# Patient Record
Sex: FEMALE | Race: WHITE | NOT HISPANIC OR LATINO | URBAN - NONMETROPOLITAN AREA
[De-identification: names, ages, dates, MRNs, and addresses within clinical notes are randomized per-mention and may not be internally consistent; named-entity substitution may affect disease eponyms.]

---

## 2020-06-09 ENCOUNTER — OFFICE VISIT CONVERTED (OUTPATIENT)
Dept: FAMILY MEDICINE CLINIC | Age: 20
End: 2020-06-09
Attending: NURSE PRACTITIONER

## 2020-06-09 ENCOUNTER — HOSPITAL ENCOUNTER (OUTPATIENT)
Dept: OTHER | Facility: HOSPITAL | Age: 20
Discharge: HOME | End: 2020-06-09
Attending: NURSE PRACTITIONER

## 2020-06-09 LAB
25(OH)D3 SERPL-MCNC: 12.3 NG/ML (ref 30–100)
ALBUMIN SERPL-MCNC: 4.1 G/DL (ref 3.5–5)
ALBUMIN/GLOB SERPL: 1.4 {RATIO} (ref 1.4–2.6)
ALP SERPL-CCNC: 34 U/L (ref 50–130)
ALT SERPL-CCNC: 13 U/L (ref 10–40)
ANION GAP SERPL CALC-SCNC: 19 MMOL/L (ref 8–19)
ASO AB SERPL-ACNC: 88 [IU]/ML (ref 0–200)
AST SERPL-CCNC: 17 U/L (ref 15–50)
BILIRUB SERPL-MCNC: 0.19 MG/DL (ref 0.2–1.3)
BUN SERPL-MCNC: 9 MG/DL (ref 5–25)
BUN/CREAT SERPL: 12 {RATIO} (ref 6–20)
CALCIUM SERPL-MCNC: 9.3 MG/DL (ref 8.7–10.4)
CHLORIDE SERPL-SCNC: 101 MMOL/L (ref 99–111)
CONV CO2: 20 MMOL/L (ref 22–32)
CONV RHEUMATOID FACTOR IGM: <10 [IU]/ML (ref 0–14)
CONV TOTAL PROTEIN: 7.1 G/DL (ref 6.3–8.2)
CREAT UR-MCNC: 0.75 MG/DL (ref 0.5–0.9)
ERYTHROCYTE [SEDIMENTATION RATE] IN BLOOD: 7 MM/H (ref 0–20)
FOLATE SERPL-MCNC: 6.5 NG/ML (ref 4.8–20)
GFR SERPLBLD BASED ON 1.73 SQ M-ARVRAT: >60 ML/MIN/{1.73_M2}
GLOBULIN UR ELPH-MCNC: 3 G/DL (ref 2–3.5)
GLUCOSE SERPL-MCNC: 84 MG/DL (ref 65–99)
MAGNESIUM SERPL-MCNC: 1.98 MG/DL (ref 1.6–2.3)
OSMOLALITY SERPL CALC.SUM OF ELEC: 280 MOSM/KG (ref 273–304)
POTASSIUM SERPL-SCNC: 3.9 MMOL/L (ref 3.5–5.3)
SODIUM SERPL-SCNC: 136 MMOL/L (ref 135–147)
URATE SERPL-MCNC: 3.9 MG/DL (ref 2.5–6.2)
VIT B12 SERPL-MCNC: 473 PG/ML (ref 211–911)

## 2020-06-10 LAB
B BURGDOR IGG+IGM SER-ACNC: <0.91 ISR (ref 0–0.9)
DSDNA AB SER-ACNC: NEGATIVE [IU]/ML
ENA AB SER IA-ACNC: NEGATIVE {RATIO}

## 2021-05-18 NOTE — PROGRESS NOTES
"Roxane Villegas  2000     Office/Outpatient Visit    Visit Date: Tue, Jun 9, 2020 08:33 am    Provider: Mona Canales N.P. (Assistant: Zuleyka Robbins MA)    Location: [facility]        Electronically signed by Mona Canales N.P. on  06/09/2020 09:45:34 AM                             Subjective:        CC: Ms. Villegas is a 19 year old White female.  This is her first visit to the clinic.  left knee pain;         HPI: 19 year old female presenting to clinic to establish care and c/o left knee pain x 2-3 weeks. She states it began to hurt 4 years ago and had went to Dr. Reardon. He informed her that she had \"loose joints\" and recommended physical therapy. She has had physical therapy on her left shoulder, right hip and left knee. She also states that her ankles both \"buckle\" at times. She has full range of motion. No specific area of pain. She went for second opinion 1-2 weeks ago as her knee began to hurt again and he said the same. She has P.T. scheduled for tomorrow. He recommended she find a pcp for work up regarding her joint pain.         Pt also c/o both anxiety and depression. States she has had this on and off for years. No suicidal thoughts. She tried counseling earlier this year and was not able to afford. She states that she is still on her parent's insurance and they are very much against medicinal intervention for anxiety/depression. This is why she was trying to pay out of pocket for counseling sessions. She is open to trying medication, and will just pay cash, so that her parents are not aware.         Medical, surgical, family and social history reviewed.    ROS:     CONSTITUTIONAL:  Negative for chills, fatigue, fever and night sweats.      CARDIOVASCULAR:  Negative for chest pain, palpitations and pedal edema.      RESPIRATORY:  Negative for recent cough and dyspnea.      GASTROINTESTINAL:  Negative for abdominal pain, constipation, diarrhea, nausea and vomiting.      " GENITOURINARY:  Negative for dysuria and urinary incontinence.      MUSCULOSKELETAL:  Positive for arthralgias and joint stiffness.   Negative for myalgias.      INTEGUMENTARY/BREAST:  Negative for rash.      NEUROLOGICAL:  Negative for dizziness, paresthesias and weakness.      PSYCHIATRIC:  Positive for feelings of stress, anhedonia and sadness.   Negative for sleep disturbance or suicidal thoughts.          Past Medical History / Family History / Social History:         Last Reviewed on 6/09/2020 08:56 AM by Mona Canales    Past Medical History:                 PAST MEDICAL HISTORY         Chicken pox: no;         GYNECOLOGICAL HISTORY:    G0    Problems with menstrual cycles include irregular frequency/duration.      Menarche occurred at age 11 yr.  Hospitalizations:    Asthma admitted multiple times         CURRENT MEDICAL PROVIDERS:    Allergist: family allergy and asthma         Surgical History:         Positive for    adnoids; and    wisdom teeth;;         Family History:     Unknown Father: Type 2 Diabetes     Maternal Grandfather: Skin Cancer     Maternal Grandmother: Cervical Cancer         Social History:         Household:  Lives with her parents and sibling(s) ( 1 brother ).  [employer] Occupation: [employer]     Marital Status: Single     Children: None         Tobacco/Alcohol/Supplements:     Last Reviewed on 6/09/2020 08:56 AM by Mona Canales    Tobacco: She has never smoked.  Non-drinker         Substance Abuse History:     Last Reviewed on 6/09/2020 08:56 AM by Mona Canales    None         Mental Health History:     Last Reviewed on 6/09/2020 08:56 AM by Mona Canales    NEGATIVE         Immunizations:     DTaP 2000    DTaP 2000    DTaP 1/3/2001    DTaP 10/18/2001    DTaP 7/19/2004    TriHIBit (DTaP/Hib) 10/18/2001    Hib PRP-OMP (3-dose) 2000    Hib PRP-OMP (3-dose) 2000    ActHIB (Hib PRP-T) 1/3/2001    Hep B (pedi/adol, 3-dose schedule) 2000    Hep B  (pedi/adol, 3-dose schedule) 1/3/2001    Hep B (pedi/adol, 3-dose schedule) 10/18/2001    IPV  Poliovirus, inactivated 2000    IPV  Poliovirus, inactivated 2000    IPV  Poliovirus, inactivated 10/18/2001    IPV  Poliovirus, inactivated 7/19/2004    MMR  (Measles-Mumps-Rubella), live 7/9/2001    MMR  (Measles-Mumps-Rubella), live 7/19/2004    Varicella, live 7/9/2001    Varicella, live 8/8/2007    Prevnar (Pneumococcal PCV 7) 2000    Prevnar (Pneumococcal PCV 7) 2000    Prevnar (Pneumococcal PCV 7) 1/3/2001    Prevnar (Pneumococcal PCV 7) 7/9/2001    Influenza, split virus (6-35 months dose) 11/20/2003    Influenza, split virus (6-35 months dose) 12/18/2003    Influenza, split virus (3+ years dose) 10/20/2004    Influenza, split virus (3+ years dose) 10/4/2005    Influenza, split virus (3+ years dose) 11/20/2006    Influenza, split virus (3+ years dose) 11/5/2007    Influenza, split virus (3+ years dose) 10/20/2008    Influenza, split virus (3+ years dose) 12/9/2009    FluMist 11/15/2011    PNEUMOVAX 23 (Pneumococcal PPV23) 11/20/2006    Menactra (Meningococcal MCV4P) 6/20/2011    Adacel (Tdap) 6/20/2011        Allergies:     Last Reviewed on 6/09/2020 08:57 AM by Mona Canales    Peanuts:          Current Medications:     Last Reviewed on 6/09/2020 08:56 AM by Mona Canales    Zyrtec 5mg/5ml Syrup [Take 1 teaspoon by mouth daily]    Symbicort     Proventil HFA 90mcg/1actuation Oral Inhaler [2 puffs bid prn]        Objective:        Vitals:         Current: 6/9/2020 8:40:04 AM    Ht:  5 ft, 4 in (45.25%);  Wt: 153 lbs (82.36%);  BMI: 26.3T: 98.2 F (oral);  BP: 123/72 mm Hg (left arm, sitting);  P: 91 bpm (left arm (BP Cuff), sitting)        Exams:     PHYSICAL EXAM:     GENERAL: vital signs recorded - well developed, well nourished;  well groomed;  no apparent distress;     NECK: range of motion is normal;     RESPIRATORY: normal respiratory rate and pattern with no distress; normal breath  sounds with no rales, rhonchi, wheezes or rubs;     CARDIOVASCULAR: normal rate; rhythm is regular;  no systolic murmur; no edema;     MUSCULOSKELETAL: normal gait; normal overall tone     NEUROLOGIC: mental status: alert and oriented x 3;     PSYCHIATRIC: affect/demeanor: flat;  normal psychomotor function; speech pattern: flat;  normal thought and perception;         Assessment:         M25.562   Pain in left knee       J45.909   Unspecified asthma, uncomplicated       J30.9   Allergic rhinitis, unspecified       M25.50   Pain in unspecified joint       F43.23   Adjustment disorder with mixed anxiety and depressed mood           ORDERS:         Meds Prescribed:       [New Rx] FLUoxetine 10 mg oral capsule [take 1 capsule (10 mg) by oral route once daily], #90 (ninety) capsules, Refills: 0 (zero)         Lab Orders:       80266  OPHELIA - [facility] RENATA w/Reflex  (Send-Out)            79870  B12FO - [facility] Vitamin B12 with Folate  (Send-Out)            09684  SED - [facility] Sedimentation rate, non-automated ESR  (Send-Out)            30556  RHF- [facility] Rheumatoid factor  (Send-Out)            53587  URIC - [facility] Uric Acid, Serum  (Send-Out)            49772  Vitamin D, 25-Hydroxy  (Send-Out)            93294  Magnesium level  (Send-Out)            42582  ASO  (Send-Out)            83047  Lyme disease antibody  (Send-Out)            98825  COMP - [facility] Comp. Metabolic Panel  (Send-Out)              Other Orders:         Depression screen positive and follow up plan documented  (In-House)                      Plan:         Pain in left kneeRecords from Dr. Serge Padgett requested. Go to P.T. as scheduled. Will notify pt of lab results. Notify clinic with any acute concerns or issues.        Unspecified asthma, uncomplicatedContinue medication as rx. Notify clinic of any acute concerns. Refills not needed at this time.        Allergic rhinitis, unspecifiedContinue to take zyrtec as needed.         Pain in unspecified jointWill notify  pt of results.     LABORATORY:  Labs ordered to be performed today include RENATA with Reflex, B12 with Folate, Comprehensive metabolic panel, ESR, rheumatoid factor, and uric acid.            Orders:       07444  OPHELIA - [facility] RENATA w/Reflex  (Send-Out)            46601  B12FO - [facility] Vitamin B12 with Folate  (Send-Out)            67838  SED - [facility] Sedimentation rate, non-automated ESR  (Send-Out)            21305  RHF- [facility] Rheumatoid factor  (Send-Out)            14186  URIC - [facility] Uric Acid, Serum  (Send-Out)            93125  Vitamin D, 25-Hydroxy  (Send-Out)            86268  Magnesium level  (Send-Out)            15841  ASO  (Send-Out)            36696  Lyme disease antibody  (Send-Out)            14245  COMP - [facility] Comp. Metabolic Panel  (Send-Out)              Adjustment disorder with mixed anxiety and depressed moodPotential s/e of medication discussed. f/u in 6 weeks scheduled. Can not afford to pay cash for both counseling and medication. Will start with medication.  Pt to contact office with any acute concerns or issues. Do not stop medication with out weaning. Pt v/u and had no further questions upon d/c.     MIPS PHQ-9 Depression Screening: Completed form scanned and in chart; Total Score 11 Positive Depression Screen: Suicide Risk Assessment completed--denies suicidal/homicidal ideation; Pharmacologic intervention initiated/modified           Prescriptions:       [New Rx] FLUoxetine 10 mg oral capsule [take 1 capsule (10 mg) by oral route once daily], #90 (ninety) capsules, Refills: 0 (zero)           Orders:         Depression screen positive and follow up plan documented  (In-House)                  Charge Capture:         Primary Diagnosis:     M25.562  Pain in left knee           Orders:      13043  Office/outpatient visit; new patient, level 4  (In-House)              J45.909  Unspecified asthma, uncomplicated     J30.9  Allergic rhinitis, unspecified     M25.50  Pain in unspecified joint     F43.23   Adjustment disorder with mixed anxiety and depressed mood           Orders:        Depression screen positive and follow up plan documented  (In-House)

## 2021-07-02 VITALS
BODY MASS INDEX: 26.12 KG/M2 | HEART RATE: 91 BPM | TEMPERATURE: 98.2 F | WEIGHT: 153 LBS | HEIGHT: 64 IN | DIASTOLIC BLOOD PRESSURE: 72 MMHG | SYSTOLIC BLOOD PRESSURE: 123 MMHG

## 2022-03-22 ENCOUNTER — APPOINTMENT (OUTPATIENT)
Dept: GENERAL RADIOLOGY | Facility: HOSPITAL | Age: 22
End: 2022-03-22

## 2022-03-22 ENCOUNTER — APPOINTMENT (OUTPATIENT)
Dept: CT IMAGING | Facility: HOSPITAL | Age: 22
End: 2022-03-22

## 2022-03-22 ENCOUNTER — HOSPITAL ENCOUNTER (EMERGENCY)
Facility: HOSPITAL | Age: 22
Discharge: HOME | End: 2022-03-22
Attending: EMERGENCY MEDICINE | Admitting: EMERGENCY MEDICINE

## 2022-03-22 VITALS
HEART RATE: 99 BPM | DIASTOLIC BLOOD PRESSURE: 99 MMHG | SYSTOLIC BLOOD PRESSURE: 137 MMHG | TEMPERATURE: 98.2 F | OXYGEN SATURATION: 100 % | BODY MASS INDEX: 26.5 KG/M2 | RESPIRATION RATE: 18 BRPM | WEIGHT: 155.2 LBS | HEIGHT: 64 IN
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
## 2022-03-22 DIAGNOSIS — S09.90XA INJURY OF HEAD, INITIAL ENCOUNTER: ICD-10-CM

## 2022-03-22 DIAGNOSIS — S19.9XXA INJURY OF NECK, INITIAL ENCOUNTER: ICD-10-CM

## 2022-03-22 LAB — B-HCG UR QL: NEGATIVE

## 2022-03-22 PROCEDURE — 70450 CT HEAD/BRAIN W/O DYE: CPT

## 2022-03-22 PROCEDURE — 72050 X-RAY EXAM NECK SPINE 4/5VWS: CPT

## 2022-03-22 PROCEDURE — 99283 EMERGENCY DEPT VISIT LOW MDM: CPT

## 2022-03-22 PROCEDURE — 81025 URINE PREGNANCY TEST: CPT | Performed by: PHYSICIAN ASSISTANT

## 2022-03-22 RX ORDER — CETIRIZINE HYDROCHLORIDE 10 MG/1
CAPSULE, LIQUID FILLED ORAL EVERY 24 HOURS
COMMUNITY

## 2022-03-22 RX ORDER — DULOXETIN HYDROCHLORIDE 60 MG/1
CAPSULE, DELAYED RELEASE ORAL EVERY 24 HOURS
COMMUNITY

## 2022-03-22 RX ORDER — BUDESONIDE AND FORMOTEROL FUMARATE DIHYDRATE 80; 4.5 UG/1; UG/1
AEROSOL RESPIRATORY (INHALATION) EVERY 24 HOURS
COMMUNITY

## 2022-03-22 RX ORDER — CYCLOBENZAPRINE HCL 10 MG
10 TABLET ORAL 2 TIMES DAILY PRN
Qty: 30 TABLET | Refills: 0 | Status: SHIPPED | OUTPATIENT
Start: 2022-03-22

## 2022-03-22 RX ORDER — ALBUTEROL SULFATE 2.5 MG/3ML
SOLUTION RESPIRATORY (INHALATION) EVERY 6 HOURS SCHEDULED
COMMUNITY
Start: 2022-02-17

## 2022-03-22 RX ORDER — MONTELUKAST SODIUM 10 MG/1
TABLET ORAL EVERY 24 HOURS
COMMUNITY
Start: 2022-02-17

## 2022-03-22 RX ORDER — ALBUTEROL SULFATE 90 UG/1
AEROSOL, METERED RESPIRATORY (INHALATION)
COMMUNITY
Start: 2022-02-17
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
## 2024-05-02 ENCOUNTER — OFFICE VISIT (OUTPATIENT)
Dept: FAMILY MEDICINE CLINIC | Age: 24
End: 2024-05-02
Payer: COMMERCIAL

## 2024-05-02 VITALS
BODY MASS INDEX: 32.13 KG/M2 | SYSTOLIC BLOOD PRESSURE: 104 MMHG | OXYGEN SATURATION: 97 % | DIASTOLIC BLOOD PRESSURE: 65 MMHG | HEART RATE: 76 BPM | HEIGHT: 64 IN | TEMPERATURE: 98.6 F | WEIGHT: 188.2 LBS

## 2024-05-02 DIAGNOSIS — L25.9 CONTACT DERMATITIS, UNSPECIFIED CONTACT DERMATITIS TYPE, UNSPECIFIED TRIGGER: ICD-10-CM

## 2024-05-02 DIAGNOSIS — J06.9 ACUTE URI: Primary | ICD-10-CM

## 2024-05-02 DIAGNOSIS — J30.2 SEASONAL ALLERGIES: ICD-10-CM

## 2024-05-02 DIAGNOSIS — J45.20 MILD INTERMITTENT ASTHMA WITHOUT COMPLICATION: ICD-10-CM

## 2024-05-02 DIAGNOSIS — F41.8 MIXED ANXIETY AND DEPRESSIVE DISORDER: ICD-10-CM

## 2024-05-02 PROBLEM — J45.909 ASTHMA: Status: ACTIVE | Noted: 2023-07-07

## 2024-05-02 LAB
EXPIRATION DATE: NORMAL
EXPIRATION DATE: NORMAL
FLUAV AG UPPER RESP QL IA.RAPID: NOT DETECTED
FLUBV AG UPPER RESP QL IA.RAPID: NOT DETECTED
INTERNAL CONTROL: NORMAL
INTERNAL CONTROL: NORMAL
Lab: NORMAL
Lab: NORMAL
S PYO AG THROAT QL: NEGATIVE
SARS-COV-2 AG UPPER RESP QL IA.RAPID: NOT DETECTED

## 2024-05-02 PROCEDURE — 87428 SARSCOV & INF VIR A&B AG IA: CPT | Performed by: NURSE PRACTITIONER

## 2024-05-02 PROCEDURE — 87880 STREP A ASSAY W/OPTIC: CPT | Performed by: NURSE PRACTITIONER

## 2024-05-02 PROCEDURE — 99203 OFFICE O/P NEW LOW 30 MIN: CPT | Performed by: NURSE PRACTITIONER

## 2024-05-02 PROCEDURE — 87081 CULTURE SCREEN ONLY: CPT | Performed by: NURSE PRACTITIONER

## 2024-05-02 RX ORDER — PREDNISONE 10 MG/1
TABLET ORAL
Qty: 26 TABLET | Refills: 0 | Status: SHIPPED | OUTPATIENT
Start: 2024-05-02 | End: 2024-05-13

## 2024-05-02 RX ORDER — BROMPHENIRAMINE MALEATE, PSEUDOEPHEDRINE HYDROCHLORIDE, AND DEXTROMETHORPHAN HYDROBROMIDE 2; 30; 10 MG/5ML; MG/5ML; MG/5ML
5 SYRUP ORAL 4 TIMES DAILY PRN
Qty: 118 ML | Refills: 0 | Status: SHIPPED | OUTPATIENT
Start: 2024-05-02 | End: 2024-05-02

## 2024-05-02 RX ORDER — PREDNISONE 20 MG/1
20 TABLET ORAL DAILY
Qty: 5 TABLET | Refills: 0 | Status: CANCELLED | OUTPATIENT
Start: 2024-05-02

## 2024-05-02 RX ORDER — BROMPHENIRAMINE MALEATE, PSEUDOEPHEDRINE HYDROCHLORIDE, AND DEXTROMETHORPHAN HYDROBROMIDE 2; 30; 10 MG/5ML; MG/5ML; MG/5ML
5 SYRUP ORAL 4 TIMES DAILY PRN
Qty: 118 ML | Refills: 0 | Status: SHIPPED | OUTPATIENT
Start: 2024-05-02

## 2024-05-02 RX ORDER — ALBUTEROL SULFATE 90 UG/1
2 AEROSOL, METERED RESPIRATORY (INHALATION) EVERY 6 HOURS PRN
Qty: 6.7 G | Refills: 11 | Status: SHIPPED | OUTPATIENT
Start: 2024-05-02

## 2024-05-02 RX ORDER — AMOXICILLIN AND CLAVULANATE POTASSIUM 600; 42.9 MG/5ML; MG/5ML
875 POWDER, FOR SUSPENSION ORAL 2 TIMES DAILY
Qty: 150 ML | Refills: 0 | Status: SHIPPED | OUTPATIENT
Start: 2024-05-02 | End: 2024-05-09

## 2024-05-02 NOTE — PROGRESS NOTES
"Chief Complaint  Cough, Headache, Nausea, and Rash (Rash on right leg)    Subjective          Roxane Villegas presents to [facility] wishing to reestablish care.  Her last visit was in 2020.  She did just have a child in February.  She was started on Zoloft 2 mg during her pregnancy.  She is taking Singulair 10 mg, Zyrtec 10 mg and Wixela for her allergies and asthma.  She also has a rescue inhaler to use as needed.    Patient is complaining of productive cough with thick green sputum headache, nausea, chills and mild soa 4 days. Denies fever, vomiting, diarrhea, loss of taste or smell or chest pain. States multiple people at work have had respiratory illnesses. Pt has tried decongestants  at home to help with symptoms.  She had went to an urgent care and was told that it was viral.  Symptoms are worsening.    Patient is also complaining of a red itchy rash to bilateral legs and buttocks.  No known exposure to plants and she has not changed any soaps, detergents, etc.        Objective   Vital Signs:   Vitals:    05/02/24 1323   BP: 104/65   BP Location: Left arm   Patient Position: Sitting   Cuff Size: Large Adult   Pulse: 76   Temp: 98.6 °F (37 °C)   TempSrc: Oral   SpO2: 97%   Weight: 85.4 kg (188 lb 3.2 oz)   Height: 162.6 cm (64\")       Body mass index is 32.3 kg/m².    Physical Exam  Vitals reviewed.   Constitutional:       General: She is not in acute distress.     Appearance: She is well-developed. She is ill-appearing.   HENT:      Head: Normocephalic and atraumatic.   Eyes:      Conjunctiva/sclera: Conjunctivae normal.      Pupils: Pupils are equal, round, and reactive to light.   Cardiovascular:      Rate and Rhythm: Normal rate and regular rhythm.      Heart sounds: Normal heart sounds. No murmur heard.  Pulmonary:      Effort: Pulmonary effort is normal. No respiratory distress.      Breath sounds: Normal breath sounds.   Skin:     General: Skin is warm and dry.      " Findings: Rash present.      Comments: Erythremic papular rash to bilateral buttocks and inner thighs   Neurological:      Mental Status: She is alert and oriented to person, place, and time.   Psychiatric:         Mood and Affect: Mood and affect normal.         Behavior: Behavior normal.         Thought Content: Thought content normal.         Judgment: Judgment normal.                     Assessment and Plan    Assessment & Plan  Acute URI  Increase fluid intake and rest.  Tylenol/ibuprofen for discomfort/fever.  Complete prescribed antibiotic and steroid.  Zyrtec and Flonase daily.  Potential side effects of medication discussed.  Notify office with any acute concerns or issues.  Follow-up as needed.  Patient to go to ED with any chest pain or shortness of air.  Mild intermittent asthma without complication    Continue Wixela and albuterol inhalers.  Go to ED with shortness of air.      Mixed anxiety and depressive disorder   continue Zoloft 50 mg daily.  Seasonal allergies  Continue Zyrtec, Singulair daily.  Contact dermatitis, unspecified contact dermatitis type, unspecified trigger  Continue and complete tapered steroid to prevent rebound rash.    Orders Placed This Encounter   Procedures    Beta Strep Culture, Throat - , Throat    POCT SARS-CoV-2 Antigen SHANICE + Flu    POCT rapid strep A     New Medications Ordered This Visit   Medications    albuterol sulfate HFA (ProAir HFA) 108 (90 Base) MCG/ACT inhaler     Sig: Inhale 2 puffs Every 6 (Six) Hours As Needed for Wheezing or Shortness of Air.     Dispense:  6.7 g     Refill:  11     Will need to keep appointment for further refills.    amoxicillin-clavulanate (AUGMENTIN) 600-42.9 MG/5ML suspension     Sig: Take 7.3mLs by mouth 2 (Two) Times a Day for 7 days. Discard remainder after 7 days     Dispense:  150 mL     Refill:  0    predniSONE (DELTASONE) 10 MG tablet     Sig: Take 4 tablets by mouth Daily for 2 days, THEN 3 tablets Daily for 3 days, THEN 2 tablets  Daily for 3 days, THEN 1 tablet Daily for 3 days.     Dispense:  26 tablet     Refill:  0    brompheniramine-pseudoephedrine-DM 30-2-10 MG/5ML syrup     Sig: Take 5mLs by mouth 4 (Four) Times a Day As Needed for Congestion or Cough. Do not take with Stahist     Dispense:  118 mL     Refill:  0         Patient to notify office with any acute concerns or issues.  Patient verbalizes understanding, agrees with plan of care and has no further questions upon discharge.    Please note that portions of this note were completed with a voice recognition program.      Follow Up    Return if symptoms worsen or fail to improve.  Patient was given instructions and counseling regarding her condition or for health maintenance advice. Please see specific information pulled into the AVS if appropriate.

## 2024-05-04 LAB — BACTERIA SPEC AEROBE CULT: NORMAL

## 2024-06-06 ENCOUNTER — OFFICE VISIT (OUTPATIENT)
Dept: FAMILY MEDICINE CLINIC | Age: 24
End: 2024-06-06
Payer: COMMERCIAL

## 2024-06-06 VITALS
HEIGHT: 64 IN | DIASTOLIC BLOOD PRESSURE: 58 MMHG | WEIGHT: 186.8 LBS | HEART RATE: 78 BPM | SYSTOLIC BLOOD PRESSURE: 104 MMHG | BODY MASS INDEX: 31.89 KG/M2 | TEMPERATURE: 98.2 F | OXYGEN SATURATION: 96 %

## 2024-06-06 DIAGNOSIS — R50.9 FEVER, UNSPECIFIED FEVER CAUSE: ICD-10-CM

## 2024-06-06 DIAGNOSIS — B34.9 ACUTE VIRAL SYNDROME: Primary | ICD-10-CM

## 2024-06-06 LAB
EXPIRATION DATE: NORMAL
FLUAV AG UPPER RESP QL IA.RAPID: NOT DETECTED
FLUBV AG UPPER RESP QL IA.RAPID: NOT DETECTED
INTERNAL CONTROL: NORMAL
Lab: NORMAL
SARS-COV-2 AG UPPER RESP QL IA.RAPID: NOT DETECTED

## 2024-06-06 PROCEDURE — 87428 SARSCOV & INF VIR A&B AG IA: CPT | Performed by: PHYSICIAN ASSISTANT

## 2024-06-06 PROCEDURE — 99213 OFFICE O/P EST LOW 20 MIN: CPT | Performed by: PHYSICIAN ASSISTANT

## 2024-06-06 NOTE — LETTER
June 6, 2024     Patient: Roxane Villegas   YOB: 2000   Date of Visit: 6/6/2024       To Whom It May Concern:    It is my medical opinion that Roxane Villegas may return to work on 6/10/2024. If fever has improved for 24 hours and she is feeling better she can return sooner.          Sincerely,          Jenniffer Browne PA-C    CC: No Recipients

## 2024-06-06 NOTE — PROGRESS NOTES
Subjective     CHIEF COMPLAINT    Chief Complaint   Patient presents with    Fever     Pt c/o fever and congestion.  Onset fever of 102 last night.                History of Present Illness  This is a 23-year-old female presenting to the clinic with congestion since yesterday morning.  This was associated with a fever with Tmax of 102 overnight.  She has also had some mild nausea, rhinorrhea and chest tightness that reminds her of her asthma.  She has not had any chest pain or wheezing.  She has not had much of a cough.  She denies any known sick contacts recently.  In regards to her asthma she has been using her albuterol 2 or 3 times a day for the last 2 days, and states this is mainly with activity when she is at work.  She does not feel short of breath at rest and is not using her inhaler overnight.  She does not feel like she needs steroids at this time.            Review of Systems   Constitutional:  Positive for fever. Negative for chills and fatigue.   HENT:  Positive for congestion and rhinorrhea. Negative for sore throat.    Respiratory:  Positive for chest tightness and shortness of breath. Negative for cough and wheezing.    Cardiovascular:  Negative for chest pain.   Gastrointestinal:  Positive for nausea. Negative for abdominal pain, diarrhea and vomiting.   Musculoskeletal:  Negative for myalgias.   Skin:  Negative for rash.   Neurological:  Negative for headaches.            Past Medical History:   Diagnosis Date    Allergies     Depression             Past Surgical History:   Procedure Laterality Date    ADENOIDECTOMY      DENTAL PROCEDURE              History reviewed. No pertinent family history.         Social History     Socioeconomic History    Marital status: Single   Tobacco Use    Smoking status: Never    Smokeless tobacco: Never   Vaping Use    Vaping status: Every Day    Substances: THC   Substance and Sexual Activity    Alcohol use: Never    Drug use: Never            No Known  "Allergies         Current Outpatient Medications on File Prior to Visit   Medication Sig Dispense Refill    albuterol (PROVENTIL) (2.5 MG/3ML) 0.083% nebulizer solution Take 1 vial by nebulization Every 4 (Four) to 6 (Six) Hours As Needed for cough, wheezing and shortness of breath 300 mL 3    albuterol sulfate HFA (ProAir HFA) 108 (90 Base) MCG/ACT inhaler Inhale 2 puffs Every 6 (Six) Hours As Needed for Wheezing or Shortness of Air. 6.7 g 11    Azelastine-Fluticasone 137-50 MCG/ACT suspension Instill 1 spray in each nostril Every 12 (Twelve) Hours. 23 g 0    brompheniramine-pseudoephedrine-DM 30-2-10 MG/5ML syrup Take 5mLs by mouth 4 (Four) Times a Day As Needed for Congestion or Cough. Do not take with Stahist 118 mL 0    Cetirizine HCl (ZyrTEC Allergy) 10 MG capsule As Needed.      EPINEPHrine (EpiPen 2-Lamonte) 0.3 MG/0.3ML solution auto-injector injection Inject 1 pen under the skin into the appropriate area as directed for acute allergic reaction 2 each 3    Fluticasone-Salmeterol (Wixela Inhub) 250-50 MCG/ACT DISKUS Inhale 1 puff Every 12 (Twelve) Hours. Rinse mouth after each use 60 each 11    montelukast (SINGULAIR) 10 MG tablet Take 1 tablet by mouth Every Night. 90 tablet 3    sertraline (ZOLOFT) 50 MG tablet Take 1 tablet by mouth Daily. 30 tablet 11     No current facility-administered medications on file prior to visit.            /58 (BP Location: Left arm, Patient Position: Sitting)   Pulse 78   Temp 98.2 °F (36.8 °C) (Oral)   Ht 162.6 cm (64\")   Wt 84.7 kg (186 lb 12.8 oz)   SpO2 96% Comment: room air  BMI 32.06 kg/m²          Objective     Physical Exam  Vitals and nursing note reviewed.   Constitutional:       General: She is not in acute distress.     Appearance: Normal appearance.   HENT:      Head: Normocephalic and atraumatic.      Right Ear: Tympanic membrane, ear canal and external ear normal.      Left Ear: Tympanic membrane, ear canal and external ear normal.      Nose: Congestion " present. No rhinorrhea.      Mouth/Throat:      Mouth: Mucous membranes are moist.      Pharynx: Oropharynx is clear. Posterior oropharyngeal erythema present.   Eyes:      Extraocular Movements: Extraocular movements intact.      Conjunctiva/sclera: Conjunctivae normal.      Pupils: Pupils are equal, round, and reactive to light.   Cardiovascular:      Rate and Rhythm: Normal rate and regular rhythm.      Heart sounds: Normal heart sounds.   Pulmonary:      Effort: Pulmonary effort is normal. No respiratory distress.      Breath sounds: Normal breath sounds. No wheezing, rhonchi or rales.   Musculoskeletal:      Cervical back: Normal range of motion. No rigidity.   Skin:     General: Skin is warm and dry.   Neurological:      Mental Status: She is alert and oriented to person, place, and time.   Psychiatric:         Mood and Affect: Mood normal.         Behavior: Behavior normal.                  Lab Results (last 24 hours)       Procedure Component Value Units Date/Time    POCT SARS-CoV-2 Antigen SHANICE + Flu [[id number]] Collected: 06/06/24 1124    Specimen: Swab Updated: 06/06/24 1125     SARS Antigen Not Detected     Influenza A Antigen SHANICE Not Detected     Influenza B Antigen SHANICE Not Detected     Internal Control Passed     Lot Number [id number]     Expiration Date 11/02/24                          Assessment & Plan  Acute viral syndrome  COVID and flu testing negative in office today.  Symptoms are most likely viral and we discussed supportive care including fluids, Tylenol and ibuprofen.  She was specifically instructed to monitor her breathing carefully.  She should return to the office if her symptoms are worsening, fever lasting longer than 5 days, she develops chest pain or shortness of breath, or she has any other concerns.  Fever, unspecified fever cause      Orders Placed This Encounter   Procedures    POCT SARS-CoV-2 Antigen SHANICE + Flu                   FOR FULL DISCHARGE INSTRUCTIONS/COMMENTS/HANDOUTS  please see the   AVS

## 2024-08-30 ENCOUNTER — OFFICE VISIT (OUTPATIENT)
Dept: FAMILY MEDICINE CLINIC | Age: 24
End: 2024-08-30
Payer: COMMERCIAL

## 2024-08-30 VITALS
HEIGHT: 64 IN | TEMPERATURE: 98.5 F | OXYGEN SATURATION: 98 % | HEART RATE: 82 BPM | SYSTOLIC BLOOD PRESSURE: 110 MMHG | WEIGHT: 190 LBS | BODY MASS INDEX: 32.44 KG/M2 | DIASTOLIC BLOOD PRESSURE: 75 MMHG

## 2024-08-30 DIAGNOSIS — F41.8 MIXED ANXIETY AND DEPRESSIVE DISORDER: ICD-10-CM

## 2024-08-30 DIAGNOSIS — J45.20 MILD INTERMITTENT ASTHMA WITHOUT COMPLICATION: ICD-10-CM

## 2024-08-30 DIAGNOSIS — Z23 NEED FOR VACCINATION: ICD-10-CM

## 2024-08-30 DIAGNOSIS — M67.479 GANGLION CYST OF FOOT: Primary | ICD-10-CM

## 2024-08-30 PROCEDURE — 99213 OFFICE O/P EST LOW 20 MIN: CPT | Performed by: FAMILY MEDICINE

## 2024-08-30 RX ORDER — DULOXETIN HYDROCHLORIDE 60 MG/1
60 CAPSULE, DELAYED RELEASE ORAL DAILY
Qty: 30 CAPSULE | Refills: 1 | Status: SHIPPED | OUTPATIENT
Start: 2024-08-30

## 2024-08-30 NOTE — PROGRESS NOTES
"Chief Complaint  Cyst (Pt states she has a cyct on left foot, would also like to discuss meds )    Subjective        Roxane Villegas presents to [facility]  History of Present Illness  Ms. Villegas presents today for evaluation of a cyst on the dorsal surface of her left foot. The cyst has been present for 8-9 years but usually pops before it gets very big. She states that it sometimes causes her pain when she wears tennis shoes. She is going to the [employer] next month and wants to take care of it before hand.     Patient also has pain on the bottoms of both of her feet that typically occurs when she stands and begins to walk. This began after she had her first child 6 months ago.     Pt is interested in a change in her Zoloft as it has not been working as well as it had when she was pregnant. Before pregnancy, patient had success with duloxetine.     Pt reports her asthma is more controlled than it has been in a while, but that her anxiety affects her lungs and breathing.    Objective   Vital Signs:  Ht 162.6 cm (64.02\")   Wt 86.2 kg (190 lb)   BMI 32.60 kg/m²   Estimated body mass index is 32.6 kg/m² as calculated from the following:    Height as of this encounter: 162.6 cm (64.02\").    Weight as of this encounter: 86.2 kg (190 lb).          Physical Exam  Constitutional:       Appearance: Normal appearance.   Cardiovascular:      Rate and Rhythm: Normal rate and regular rhythm.      Heart sounds: Normal heart sounds.   Pulmonary:      Effort: Pulmonary effort is normal.      Breath sounds: Normal breath sounds.   Feet:      Comments: Lateral left foot overlying 4th extensor tendon, freely mobile, round, smooth cystic structure  Neurological:      Mental Status: She is alert and oriented to person, place, and time.   Psychiatric:         Mood and Affect: Mood normal.         Behavior: Behavior normal.        Result Review :                Assessment and Plan     Diagnoses and " all orders for this visit:  1) Mixed anxiety and depressive disorder (Primary)  - discontinue sertraline   - begin duloxetine 50 mg QD    2) Ganglion cyst of foot  - drained cyst    3) Need for vaccination  - encourage Flu and COVID vaccine due to Asthma        Follow Up   No follow-ups on file.  Patient was given instructions and counseling regarding her condition or for health maintenance advice. Please see specific information pulled into the AVS if appropriate.

## 2024-08-30 NOTE — PROGRESS NOTES
Chief Complaint  Cyst (Pt states she has a cyct on left foot, would also like to discuss zoloft)    Subjective          Roxane Villegas presents to [facility]  History of Present Illness    Please see the student note from today.  Patient history, exam, and assessment independently verified.    Patient reports a swollen area on the dorsum of the left foot that uncomfortable and bothersome to her.  Rubs on her shoe.  (Lat left foot 4th digit tendon)    Pt is interested in a change in her Zoloft as it has not been working as well as it had when she was pregnant. Before pregnancy, patient had success with duloxetine.      Pt reports her asthma is more controlled than it has been in a while, but that her anxiety affects her lungs and breathing.    Current Outpatient Medications on File Prior to Visit   Medication Sig Dispense Refill    albuterol (PROVENTIL) (2.5 MG/3ML) 0.083% nebulizer solution Take 1 vial by nebulization Every 4 (Four) to 6 (Six) Hours As Needed for cough, wheezing and shortness of breath 300 mL 3    albuterol sulfate HFA (ProAir HFA) 108 (90 Base) MCG/ACT inhaler Inhale 2 puffs Every 6 (Six) Hours As Needed for Wheezing or Shortness of Air. 6.7 g 11    Azelastine-Fluticasone 137-50 MCG/ACT suspension Instill 1 spray in each nostril Every 12 (Twelve) Hours. 23 g 0    brompheniramine-pseudoephedrine-DM 30-2-10 MG/5ML syrup Take 5mLs by mouth 4 (Four) Times a Day As Needed for Congestion or Cough. Do not take with Stahist 118 mL 0    Cetirizine HCl (ZyrTEC Allergy) 10 MG capsule As Needed.      EPINEPHrine (EpiPen 2-Lamonte) 0.3 MG/0.3ML solution auto-injector injection Inject 1 pen under the skin into the appropriate area as directed for acute allergic reaction 2 each 3    Fluticasone-Salmeterol (Wixela Inhub) 250-50 MCG/ACT DISKUS Inhale 1 puff Every 12 (Twelve) Hours. Rinse mouth after each use 60 each 11    montelukast (SINGULAIR) 10 MG tablet Take 1 tablet by mouth  "Every Night. 90 tablet 3     No current facility-administered medications on file prior to visit.       Review of Systems         Objective   Vital Signs:   /75 (BP Location: Left arm, Patient Position: Sitting)   Pulse 82   Temp 98.5 °F (36.9 °C) (Oral)   Ht 162.6 cm (64.02\")   Wt 86.2 kg (190 lb)   SpO2 98%   BMI 32.60 kg/m²     Physical Exam     No acute distress  Heart regular rate and rhythm  Lungs clear  Lateral left foot overlying 4th extensor tendon, freely mobile, round, smooth cystic structure.  Just a little bit more proximal there is a second area that is much smaller cystic dilatation, but too small to aspirate.    Result Review :              - Injection Tendon or Ligament    Date/Time: 9/7/2024 4:32 PM    Performed by: Edd Scales MD  Authorized by: Edd Scales MD  Local anesthesia used: yes    Anesthesia:  Local anesthesia used: yes  Local anesthetic: Ethyl chloride spray.    Sedation:  Patient sedated: no    Comments: Cyst located by site and palpation.  Local anesthesia with ethylene chloride spray.  Insertion of 18-gauge needle aspirated the about 1-1/2 cc of clear gelatinous material with immediate collapse of the cyst.  Patient tolerated well.  No blood loss.  Sterile dressing applied.            Assessment and Plan    Diagnoses and all orders for this visit:    1. Ganglion cyst of foot (Primary)  Assessment & Plan:  After informed consent, sterile prep and drape, number 18-gauge needle attached to 3 cc syringe used to aspirate cyst under direct visualization and palpation after initial anesthesia with ethylene chloride spray.  Tolerated well.  No blood loss.    Orders:  -     - Injection Tendon or Ligament    2. Mixed anxiety and depressive disorder  Assessment & Plan:  Will switch her from Zoloft back to her duloxetine.  Should help with her foot discomfort as well.      Orders:  -     DULoxetine (CYMBALTA) 60 MG capsule; Take 1 capsule by mouth Daily.  " Dispense: 30 capsule; Refill: 1    3. Mild intermittent asthma without complication  Assessment & Plan:  Remains stable on her current regimen.  Did recommend immunizations.              4. Need for vaccination  Comments:  Due to her diagnosis of asthma encourage influenza vaccination and COVID booster shot once available    Other orders  -     Cancel: Arthrocentesis        Follow Up   No follow-ups on file.  Patient was given instructions and counseling regarding her condition or for health maintenance advice. Please see specific information pulled into the AVS if appropriate.

## 2024-09-07 PROCEDURE — 20550 NJX 1 TENDON SHEATH/LIGAMENT: CPT | Performed by: FAMILY MEDICINE

## 2024-09-07 NOTE — ASSESSMENT & PLAN NOTE
After informed consent, sterile prep and drape, number 18-gauge needle attached to 3 cc syringe used to aspirate cyst under direct visualization and palpation after initial anesthesia with ethylene chloride spray.  Tolerated well.  No blood loss.

## 2024-09-07 NOTE — ASSESSMENT & PLAN NOTE
Will switch her from Zoloft back to her duloxetine.  Should help with her foot discomfort as well.

## 2024-10-29 ENCOUNTER — OFFICE VISIT (OUTPATIENT)
Dept: FAMILY MEDICINE CLINIC | Age: 24
End: 2024-10-29
Payer: COMMERCIAL

## 2024-10-29 VITALS
WEIGHT: 194 LBS | HEIGHT: 64 IN | BODY MASS INDEX: 33.12 KG/M2 | TEMPERATURE: 98.3 F | OXYGEN SATURATION: 96 % | HEART RATE: 90 BPM | DIASTOLIC BLOOD PRESSURE: 70 MMHG | SYSTOLIC BLOOD PRESSURE: 119 MMHG

## 2024-10-29 DIAGNOSIS — F41.8 MIXED ANXIETY AND DEPRESSIVE DISORDER: Primary | ICD-10-CM

## 2024-10-29 PROCEDURE — 99214 OFFICE O/P EST MOD 30 MIN: CPT | Performed by: NURSE PRACTITIONER

## 2024-10-29 RX ORDER — BUSPIRONE HYDROCHLORIDE 7.5 MG/1
7.5 TABLET ORAL 3 TIMES DAILY
Qty: 270 TABLET | Refills: 0 | Status: SHIPPED | OUTPATIENT
Start: 2024-10-29

## 2024-10-29 NOTE — ASSESSMENT & PLAN NOTE
Weaning scheduled provided for cymbalta. Pt to start both zoloft 50mg and buspar 7.5mg. Waiting 1 week before starting the second.

## 2024-10-29 NOTE — PROGRESS NOTES
"Chief Complaint  Anxiety (Would like to change medications, duloxetine making patient feel like she's in a brain fog)    Subjective          Roxane Villegas presents to [facility] to discuss anxiety/depression. She is currently on cymbalta 60mg daily, but feels as if she is in a \"brain fog\". Would like to try something else.     Over the last two weeks, how often have you been bothered by the following problems?  Feeling nervous, anxious or on edge: Nearly every day  Not being able to stop or control worrying: Several days  Worrying too much about different things: More than half the days  Trouble Relaxing: Several days  Being so restless that it is hard to sit still: Not at all  Becoming easily annoyed or irritable: Not at all  Feeling afraid as if something awful might happen: Several days  LOGAN 7 Total Score: 8  If you checked any problems, how difficult have these problems made it for you to do your work, take care of things at home, or get along with other people: Somewhat difficult    PHQ-9 Depression Screening  Little interest or pleasure in doing things? Almost all   Feeling down, depressed, or hopeless? Over half   PHQ-2 Total Score 5   Trouble falling or staying asleep, or sleeping too much? Almost all   Feeling tired or having little energy? Almost all   Poor appetite or overeating? Almost all   Feeling bad about yourself - or that you are a failure or have let yourself or your family down? Several days   Trouble concentrating on things, such as reading the newspaper or watching television? Over half   Moving or speaking so slowly that other people could have noticed? Or the opposite - being so fidgety or restless that you have been moving around a lot more than usual? Not at all   Thoughts that you would be better off dead, or of hurting yourself in some way? Not at all   PHQ-9 Total Score 17   If you checked off any problems, how difficult have these problems made it for " "you to do your work, take care of things at home, or get along with other people? Very difficult       Objective   Vital Signs:   Vitals:    10/29/24 1052   BP: 119/70   Pulse: 90   Temp: 98.3 °F (36.8 °C)   SpO2: 96%   Weight: 88 kg (194 lb)   Height: 162.6 cm (64.02\")       Body mass index is 33.28 kg/m².         Physical Exam  Constitutional:       General: She is not in acute distress.     Appearance: Normal appearance. She is not ill-appearing.   Pulmonary:      Effort: Pulmonary effort is normal. No respiratory distress.   Neurological:      Mental Status: She is alert and oriented to person, place, and time.   Psychiatric:         Mood and Affect: Mood normal.         Behavior: Behavior normal.         Thought Content: Thought content normal.         Judgment: Judgment normal.                     Assessment and Plan    Assessment & Plan  Mixed anxiety and depressive disorder  Weaning scheduled provided for cymbalta. Pt to start both zoloft 50mg and buspar 7.5mg. Waiting 1 week before starting the second.          New Medications Ordered This Visit   Medications    sertraline (Zoloft) 50 MG tablet     Sig: Take 1 tablet by mouth Daily.     Dispense:  90 tablet     Refill:  1    busPIRone (BUSPAR) 7.5 MG tablet     Sig: Take 1 tablet by mouth 3 (Three) Times a Day.     Dispense:  270 tablet     Refill:  0         Patient to notify office with any acute concerns or issues.  Patient verbalizes understanding, agrees with plan of care and has no further questions upon discharge.    Please note that portions of this note were completed with a voice recognition program.      Follow Up    Return in about 6 weeks (around 12/10/2024) for Recheck.  Patient was given instructions and counseling regarding her condition or for health maintenance advice. Please see specific information pulled into the AVS if appropriate.     Medications Discontinued During This Encounter   Medication Reason    " brompheniramine-pseudoephedrine-DM 30-2-10 MG/5ML syrup Historical Med - Therapy completed    DULoxetine (CYMBALTA) 60 MG capsule Historical Med - Therapy completed

## 2025-01-16 ENCOUNTER — OFFICE VISIT (OUTPATIENT)
Dept: FAMILY MEDICINE CLINIC | Age: 25
End: 2025-01-16
Payer: COMMERCIAL

## 2025-01-16 ENCOUNTER — LAB (OUTPATIENT)
Dept: LAB | Facility: HOSPITAL | Age: 25
End: 2025-01-16
Payer: COMMERCIAL

## 2025-01-16 VITALS
HEIGHT: 64 IN | OXYGEN SATURATION: 99 % | DIASTOLIC BLOOD PRESSURE: 70 MMHG | BODY MASS INDEX: 33.46 KG/M2 | TEMPERATURE: 98 F | HEART RATE: 73 BPM | WEIGHT: 196 LBS | SYSTOLIC BLOOD PRESSURE: 118 MMHG

## 2025-01-16 DIAGNOSIS — Z11.59 NEED FOR HEPATITIS C SCREENING TEST: ICD-10-CM

## 2025-01-16 DIAGNOSIS — R10.84 GENERALIZED ABDOMINAL PAIN: Primary | ICD-10-CM

## 2025-01-16 DIAGNOSIS — R10.84 GENERALIZED ABDOMINAL PAIN: ICD-10-CM

## 2025-01-16 LAB
ALBUMIN SERPL-MCNC: 4 G/DL (ref 3.5–5.2)
ALBUMIN/GLOB SERPL: 1.2 G/DL
ALP SERPL-CCNC: 58 U/L (ref 39–117)
ALT SERPL W P-5'-P-CCNC: 28 U/L (ref 1–33)
ANION GAP SERPL CALCULATED.3IONS-SCNC: 11 MMOL/L (ref 5–15)
AST SERPL-CCNC: 23 U/L (ref 1–32)
BACTERIA UR QL AUTO: ABNORMAL /HPF
BASOPHILS # BLD AUTO: 0.05 10*3/MM3 (ref 0–0.2)
BASOPHILS NFR BLD AUTO: 0.5 % (ref 0–1.5)
BILIRUB SERPL-MCNC: 0.3 MG/DL (ref 0–1.2)
BILIRUB UR QL STRIP: NEGATIVE
BUN SERPL-MCNC: 8 MG/DL (ref 6–20)
BUN/CREAT SERPL: 10.8 (ref 7–25)
CALCIUM SPEC-SCNC: 9.4 MG/DL (ref 8.6–10.5)
CHLORIDE SERPL-SCNC: 107 MMOL/L (ref 98–107)
CLARITY UR: ABNORMAL
CO2 SERPL-SCNC: 26 MMOL/L (ref 22–29)
COLOR UR: ABNORMAL
CREAT SERPL-MCNC: 0.74 MG/DL (ref 0.57–1)
DEPRECATED RDW RBC AUTO: 40.1 FL (ref 37–54)
EGFRCR SERPLBLD CKD-EPI 2021: 116 ML/MIN/1.73
EOSINOPHIL # BLD AUTO: 0.99 10*3/MM3 (ref 0–0.4)
EOSINOPHIL NFR BLD AUTO: 10.8 % (ref 0.3–6.2)
ERYTHROCYTE [DISTWIDTH] IN BLOOD BY AUTOMATED COUNT: 13.1 % (ref 12.3–15.4)
GLOBULIN UR ELPH-MCNC: 3.3 GM/DL
GLUCOSE SERPL-MCNC: 94 MG/DL (ref 65–99)
GLUCOSE UR STRIP-MCNC: NEGATIVE MG/DL
HCT VFR BLD AUTO: 43.6 % (ref 34–46.6)
HCV AB SER QL: NORMAL
HGB BLD-MCNC: 14.2 G/DL (ref 12–15.9)
HGB UR QL STRIP.AUTO: ABNORMAL
IMM GRANULOCYTES # BLD AUTO: 0.01 10*3/MM3 (ref 0–0.05)
IMM GRANULOCYTES NFR BLD AUTO: 0.1 % (ref 0–0.5)
KETONES UR QL STRIP: NEGATIVE
LEUKOCYTE ESTERASE UR QL STRIP.AUTO: NEGATIVE
LIPASE SERPL-CCNC: 71 U/L (ref 13–60)
LYMPHOCYTES # BLD AUTO: 2.79 10*3/MM3 (ref 0.7–3.1)
LYMPHOCYTES NFR BLD AUTO: 30.4 % (ref 19.6–45.3)
MCH RBC QN AUTO: 27.5 PG (ref 26.6–33)
MCHC RBC AUTO-ENTMCNC: 32.6 G/DL (ref 31.5–35.7)
MCV RBC AUTO: 84.3 FL (ref 79–97)
MONOCYTES # BLD AUTO: 0.55 10*3/MM3 (ref 0.1–0.9)
MONOCYTES NFR BLD AUTO: 6 % (ref 5–12)
MUCOUS THREADS URNS QL MICRO: ABNORMAL /HPF
NEUTROPHILS NFR BLD AUTO: 4.8 10*3/MM3 (ref 1.7–7)
NEUTROPHILS NFR BLD AUTO: 52.2 % (ref 42.7–76)
NITRITE UR QL STRIP: NEGATIVE
PH UR STRIP.AUTO: 6 [PH] (ref 5–8)
PLATELET # BLD AUTO: 304 10*3/MM3 (ref 140–450)
PMV BLD AUTO: 9.6 FL (ref 6–12)
POTASSIUM SERPL-SCNC: 3.9 MMOL/L (ref 3.5–5.2)
PROT SERPL-MCNC: 7.3 G/DL (ref 6–8.5)
PROT UR QL STRIP: ABNORMAL
RBC # BLD AUTO: 5.17 10*6/MM3 (ref 3.77–5.28)
RBC # UR STRIP: ABNORMAL /HPF
REF LAB TEST METHOD: ABNORMAL
SODIUM SERPL-SCNC: 144 MMOL/L (ref 136–145)
SP GR UR STRIP: >=1.03 (ref 1–1.03)
SQUAMOUS #/AREA URNS HPF: ABNORMAL /HPF
UROBILINOGEN UR QL STRIP: ABNORMAL
WBC # UR STRIP: ABNORMAL /HPF
WBC NRBC COR # BLD AUTO: 9.19 10*3/MM3 (ref 3.4–10.8)

## 2025-01-16 PROCEDURE — 83690 ASSAY OF LIPASE: CPT

## 2025-01-16 PROCEDURE — 36415 COLL VENOUS BLD VENIPUNCTURE: CPT

## 2025-01-16 PROCEDURE — 99214 OFFICE O/P EST MOD 30 MIN: CPT | Performed by: NURSE PRACTITIONER

## 2025-01-16 PROCEDURE — 85025 COMPLETE CBC W/AUTO DIFF WBC: CPT

## 2025-01-16 PROCEDURE — 86803 HEPATITIS C AB TEST: CPT

## 2025-01-16 PROCEDURE — 80053 COMPREHEN METABOLIC PANEL: CPT

## 2025-01-16 PROCEDURE — 81001 URINALYSIS AUTO W/SCOPE: CPT

## 2025-01-16 RX ORDER — PANTOPRAZOLE SODIUM 40 MG/1
40 TABLET, DELAYED RELEASE ORAL DAILY
Qty: 30 TABLET | Refills: 0 | Status: SHIPPED | OUTPATIENT
Start: 2025-01-16

## 2025-01-16 NOTE — PROGRESS NOTES
Chief Complaint  GI Problem (Acid reflux x 3 months off and on )    Subjective        Roxane Villegas presents to [facility] today for 3 to 4-month history of acid reflux, indigestion epigastric pain, worse after eating does have some nausea but no vomiting.  Denies pain in the lower abdomen, bowels moved yesterday, bowel movements are normal.  Denies fever.  Has tried over-the-counter Pepcid and Tums without improvement.      Current Outpatient Medications:     albuterol (PROVENTIL) (2.5 MG/3ML) 0.083% nebulizer solution, Take 1 vial by nebulization Every 4 (Four) to 6 (Six) Hours As Needed for cough, wheezing and shortness of breath, Disp: 300 mL, Rfl: 3    albuterol sulfate HFA (ProAir HFA) 108 (90 Base) MCG/ACT inhaler, Inhale 2 puffs Every 6 (Six) Hours As Needed for Wheezing or Shortness of Air., Disp: 6.7 g, Rfl: 11    Azelastine-Fluticasone 137-50 MCG/ACT suspension, Instill 1 spray in each nostril Every 12 (Twelve) Hours. (Patient taking differently: 1 spray by Each Nare route As Needed.), Disp: 23 g, Rfl: 0    busPIRone (BUSPAR) 7.5 MG tablet, Take 1 tablet by mouth 3 (Three) Times a Day., Disp: 270 tablet, Rfl: 0    Cetirizine HCl (ZyrTEC Allergy) 10 MG capsule, Daily., Disp: , Rfl:     EPINEPHrine (EpiPen 2-Lamonte) 0.3 MG/0.3ML solution auto-injector injection, Inject 1 pen under the skin into the appropriate area as directed for acute allergic reaction, Disp: 2 each, Rfl: 3    Fluticasone-Salmeterol (Wixela Inhub) 250-50 MCG/ACT DISKUS, Inhale 1 puff by mouth Every 12 (Twelve) Hours. Rinse mouth after each use, Disp: 60 each, Rfl: 11    montelukast (SINGULAIR) 10 MG tablet, Take 1 tablet by mouth Every Night., Disp: 30 tablet, Rfl: 11    sertraline (Zoloft) 50 MG tablet, Take 1 tablet by mouth Daily., Disp: 90 tablet, Rfl: 1    pantoprazole (Protonix) 40 MG EC tablet, Take 1 tablet by mouth Daily., Disp: 30 tablet, Rfl: 0  There are no discontinued  "medications.      Allergies:  Peanut-containing drug products      Objective   Vital Signs:   Vitals:    01/16/25 0836   BP: 118/70   BP Location: Left arm   Patient Position: Sitting   Cuff Size: Adult   Pulse: 73   Temp: 98 °F (36.7 °C)   TempSrc: Oral   SpO2: 99%   Weight: 88.9 kg (196 lb)   Height: 162.6 cm (64.02\")     Body mass index is 33.63 kg/m².          Physical Exam  Constitutional:       Appearance: Normal appearance.   Neck:      Vascular: No carotid bruit.   Cardiovascular:      Rate and Rhythm: Normal rate and regular rhythm.      Heart sounds: Normal heart sounds.   Pulmonary:      Effort: Pulmonary effort is normal.      Breath sounds: Normal breath sounds.   Abdominal:      General: Bowel sounds are normal.      Tenderness: There is abdominal tenderness (Epigastric area).   Musculoskeletal:         General: Normal range of motion.   Skin:     General: Skin is warm and dry.   Neurological:      General: No focal deficit present.      Mental Status: She is alert.   Psychiatric:         Mood and Affect: Mood normal.         Behavior: Behavior normal.             Lab Results   Component Value Date    GLUCOSE 84 06/09/2020    BUN 9 06/09/2020    CREATININE 0.75 06/09/2020    BCR 12 06/09/2020    K 3.9 06/09/2020    CO2 20 (L) 06/09/2020    CALCIUM 9.3 06/09/2020    ALBUMIN 4.1 06/09/2020    AST 17 06/09/2020    ALT 13 06/09/2020             Procedures         Diagnoses and all orders for this visit:    1. Generalized abdominal pain (Primary)  -     Comprehensive Metabolic Panel; Future  -     CBC & Differential; Future  -     Urinalysis With Culture If Indicated -; Future  -     Lipase; Future  -     H. Pylori Breath Test - Breath, Lung; Future  -     pantoprazole (Protonix) 40 MG EC tablet; Take 1 tablet by mouth Daily.  Dispense: 30 tablet; Refill: 0    2. Need for hepatitis C screening test  -     Hepatitis C Antibody; Future            Follow Up  Return for If not improving or symptoms are getting " worse.  Patient was given instructions and counseling regarding her condition or for health maintenance advice. Please see specific information pulled into the AVS if appropriate.       Discussed the need to take the Protonix for 1 month if symptoms are improving and have cleared up then there is no need for follow-up.  If his symptoms continue after taking completing the medication we will need to follow-up may need an EGD.    Caren Louie, APRN  01/16/2025    Please note that portions of this document were completed using a voice recognition program.

## 2025-01-17 ENCOUNTER — LAB (OUTPATIENT)
Dept: LAB | Facility: HOSPITAL | Age: 25
End: 2025-01-17
Payer: COMMERCIAL

## 2025-01-17 DIAGNOSIS — R10.84 GENERALIZED ABDOMINAL PAIN: ICD-10-CM

## 2025-01-17 PROCEDURE — 83013 H PYLORI (C-13) BREATH: CPT

## 2025-01-19 LAB — UREA BREATH TEST QL: NEGATIVE

## 2025-02-20 DIAGNOSIS — F41.8 MIXED ANXIETY AND DEPRESSIVE DISORDER: ICD-10-CM

## 2025-02-20 RX ORDER — BUSPIRONE HYDROCHLORIDE 7.5 MG/1
7.5 TABLET ORAL 3 TIMES DAILY
Qty: 270 TABLET | Refills: 0 | Status: CANCELLED | OUTPATIENT
Start: 2025-02-20

## 2025-05-29 ENCOUNTER — TELEPHONE (OUTPATIENT)
Dept: FAMILY MEDICINE CLINIC | Age: 25
End: 2025-05-29
Payer: COMMERCIAL

## 2025-05-29 NOTE — TELEPHONE ENCOUNTER
Caller: Roxane Villegas    Relationship to patient: Self    Best call back number: [phone]     Patient is needing: PATIENT CALLED IN NEEDING TO SCHEDULED HER PHYSICAL- APRN BRITTNI FLOWERS WAS NOT AVAILABLE UNTIL JULY SO PATIENT PREFERRED TO SEE ANOTHER PROVIDER, PLEASE ADVISE

## 2025-06-11 ENCOUNTER — TELEPHONE (OUTPATIENT)
Dept: FAMILY MEDICINE CLINIC | Age: 25
End: 2025-06-11
Payer: COMMERCIAL

## 2025-06-11 NOTE — TELEPHONE ENCOUNTER
Hub to read, Called pt regarding late cancel, they did not reschedule and was made aware of the late cancel policy- 6/11/25 AB